# Patient Record
Sex: FEMALE | Race: WHITE | NOT HISPANIC OR LATINO | Employment: FULL TIME | URBAN - METROPOLITAN AREA
[De-identification: names, ages, dates, MRNs, and addresses within clinical notes are randomized per-mention and may not be internally consistent; named-entity substitution may affect disease eponyms.]

---

## 2020-10-28 ENCOUNTER — TELEPHONE (OUTPATIENT)
Dept: DERMATOLOGY | Facility: CLINIC | Age: 30
End: 2020-10-28
Payer: COMMERCIAL

## 2020-10-28 NOTE — TELEPHONE ENCOUNTER
" [facility] Call Center    Phone Message    May a detailed message be left on voicemail: yes     Reason for Call: Other: Pt sent in an online request to be seen for \"Reaction to possible bites on hand and face that won't go away, thought it was insect bites, inflamed, itchy, regular doctors said general dermatitis, have been on topical steroid for 2wks+ and got a little better than worse again\"  Please review and contact pt to discuss symptoms, thanks!     Action Taken: Message routed to:  [facility] ([facility] Dermatology    Travel Screening: Not Applicable                                                                        "

## 2022-08-22 ENCOUNTER — OFFICE VISIT (OUTPATIENT)
Dept: OBGYN | Facility: CLINIC | Age: 32
End: 2022-08-22
Payer: COMMERCIAL

## 2022-08-22 VITALS — WEIGHT: 274.7 LBS | SYSTOLIC BLOOD PRESSURE: 100 MMHG | DIASTOLIC BLOOD PRESSURE: 70 MMHG

## 2022-08-22 DIAGNOSIS — Z30.430 ENCOUNTER FOR INSERTION OF INTRAUTERINE CONTRACEPTIVE DEVICE: ICD-10-CM

## 2022-08-22 DIAGNOSIS — Z30.430 ENCOUNTER FOR INSERTION OF MIRENA IUD: ICD-10-CM

## 2022-08-22 DIAGNOSIS — Z30.46 ENCOUNTER FOR NEXPLANON REMOVAL: ICD-10-CM

## 2022-08-22 PROCEDURE — 58300 INSERT INTRAUTERINE DEVICE: CPT | Performed by: OBSTETRICS & GYNECOLOGY

## 2022-08-22 PROCEDURE — 87591 N.GONORRHOEAE DNA AMP PROB: CPT | Performed by: OBSTETRICS & GYNECOLOGY

## 2022-08-22 PROCEDURE — 87491 CHLMYD TRACH DNA AMP PROBE: CPT | Performed by: OBSTETRICS & GYNECOLOGY

## 2022-08-22 PROCEDURE — 99207 PR DROP WITH A PROCEDURE: CPT | Mod: 51 | Performed by: OBSTETRICS & GYNECOLOGY

## 2022-08-22 PROCEDURE — 11982 REMOVE DRUG IMPLANT DEVICE: CPT | Performed by: OBSTETRICS & GYNECOLOGY

## 2022-08-22 NOTE — PROGRESS NOTES
INDICATIONS:                                                      Michelle is here today for removal of Nexplanon contraceptive implant. It was placed 5 years ago. She would like to have a mirena IUD placed today for contraception. Patient's last menstrual period was 08/12/2022 (exact date). no intercourse since then.     Written consent was obtained for both procedures.     Today's PHQ-2 Score:   PHQ-2 ( 1999 Pf[employer]) 8/22/2022   Q1: Little interest or pleasure in doing things 0   Q2: Feeling down, depressed or hopeless 0   PHQ-2 Score 0       PROCEDURE:                                                      /70   Wt 124.6 kg (274 lb 11.2 oz)   LMP 08/12/2022 (Exact Date)    Patient was placed in dorsal supine position with left arm abducted and externally rotated. Nexplanon was palpated under skin. The area was cleansed with betadine. The distal site was injected with 1 ml of 1% plain lidocaine.  While pushing down on the proximal end, 2 mm incision was made over the distal implant with a scalpel. The implant was grasped with a mosquito forceps and removed intact. The skin was closed with Steristrip. A pressure bandage was placed for the next 12-24 hours.  She tolerated the procedure well. There were no complications.     Intrauterine Device Insertion:    The pelvic exam revealed normal external genitalia. On bimanual exam the uterus was Midposition and normal in size with no tenderness present. A speculum was inserted into the vagina and the cervix was visualized. Gonorrhea/chlamydia swab obtained. The cervix was prepped with Betadine . The anterior lip of the cervix was grasped with a single toothed tenaculum. The uterus sounded to 9 cm. A Mirena IUD was then inserted without difficulty. The string was cut to 2 cm.  The patient experienced a mild amount of cramping.     Return to clinic in w for IUD string check. Counseled on back up method for 7 days.     Swati Champagne MD

## 2022-08-23 LAB
C TRACH DNA SPEC QL NAA+PROBE: NEGATIVE
N GONORRHOEA DNA SPEC QL NAA+PROBE: NEGATIVE

## 2022-08-27 ENCOUNTER — HEALTH MAINTENANCE LETTER (OUTPATIENT)
Age: 32
End: 2022-08-27

## 2022-09-26 ENCOUNTER — OFFICE VISIT (OUTPATIENT)
Dept: OBGYN | Facility: CLINIC | Age: 32
End: 2022-09-26
Payer: COMMERCIAL

## 2022-09-26 VITALS — DIASTOLIC BLOOD PRESSURE: 78 MMHG | SYSTOLIC BLOOD PRESSURE: 116 MMHG | WEIGHT: 268.9 LBS

## 2022-09-26 DIAGNOSIS — Z30.431 IUD CHECK UP: Primary | ICD-10-CM

## 2022-09-26 PROCEDURE — 99213 OFFICE O/P EST LOW 20 MIN: CPT | Performed by: OBSTETRICS & GYNECOLOGY

## 2022-09-26 NOTE — PROGRESS NOTES
Gyn Clinic Visit  2022    CC: IUD follow up    S: Michelle Kaur is a 32 year old  here for follow up visit after placement of mirena IUD on 22. She reports a couple days of scant vaginal bleeding followed by a regular period with increased cramping over baseline, no pain with intercourse. Overall happy with IUD. Did not try NSAIDs for dysmenorrhea.     O:   /78   Wt 122 kg (268 lb 14.4 oz)   LMP 2022 (Exact Date)   Gen: resting comfortably, in NAD  : External genitalia normal well-estrogenized, healthy tissue.  No obvious excoriations, lesions, or rashes. Bartholins, urethra, skeins normal.  Normal pink vaginal mucosa.  SSE: Normal cervix, normal physiologic discharge, IUD strings present at external cervical os.     A: Michelle Kaur is a 32 year old female  here for follow up of IUD. No concerns at this time.     P:   - discussed scheduled NSAIDs for dysmenorrhea, she will try for next 2 cycles and contact me if no improvement.   - IUD due out in 2030. Discussed potential for longer duration of use being approved by that time.   - Return for annual gyn exams and routine pap smears or for any new onset symptoms or concerns.      Swati Champagne MD   Obstetrics and Gynecology

## 2022-09-26 NOTE — NURSING NOTE
Chief Complaint   Patient presents with     IUD     Patient had Mirena IUD inserted 22, the first couple of weeks patient reports she had a lot of cramping. But that has resolved. No concerns today       Initial /78   Wt 122 kg (268 lb 14.4 oz)   LMP 2022 (Exact Date)  There is no height or weight on file to calculate BMI.  BP completed using cuff size: large    Questioned patient about current smoking habits.  Pt. has never smoked.          The following HM Due: NONE      Farrah Tanner CMA

## 2022-11-11 ENCOUNTER — OFFICE VISIT (OUTPATIENT)
Dept: URGENT CARE | Facility: URGENT CARE | Age: 32
End: 2022-11-11
Payer: COMMERCIAL

## 2022-11-11 VITALS
SYSTOLIC BLOOD PRESSURE: 119 MMHG | DIASTOLIC BLOOD PRESSURE: 85 MMHG | OXYGEN SATURATION: 97 % | TEMPERATURE: 98.2 F | HEART RATE: 88 BPM

## 2022-11-11 DIAGNOSIS — J45.21 MILD INTERMITTENT ASTHMA WITH ACUTE EXACERBATION: Primary | ICD-10-CM

## 2022-11-11 DIAGNOSIS — J22 LRTI (LOWER RESPIRATORY TRACT INFECTION): ICD-10-CM

## 2022-11-11 PROCEDURE — 99203 OFFICE O/P NEW LOW 30 MIN: CPT | Performed by: PHYSICIAN ASSISTANT

## 2022-11-11 RX ORDER — ALBUTEROL SULFATE 90 UG/1
1-2 AEROSOL, METERED RESPIRATORY (INHALATION)
Qty: 18 G | Refills: 1 | Status: SHIPPED | OUTPATIENT
Start: 2022-11-11

## 2022-11-11 RX ORDER — PREDNISONE 20 MG/1
40 TABLET ORAL DAILY
Qty: 10 TABLET | Refills: 0 | Status: SHIPPED | OUTPATIENT
Start: 2022-11-11 | End: 2022-11-16

## 2022-11-11 RX ORDER — AZITHROMYCIN 250 MG/1
TABLET, FILM COATED ORAL
Qty: 6 TABLET | Refills: 0 | Status: SHIPPED | OUTPATIENT
Start: 2022-11-11 | End: 2022-11-16

## 2022-11-11 NOTE — LETTER
[facility] [address] [place] [address]  Phone: [phone]    November 11, 2022        Michelle GIANNI [place]  [address] [place] [address]          To whom it may concern:    RE: Michelle Kaur    Patient was seen and treated today at our clinic.  Please excuse absences 11/12 and 11/13/22.    Please contact me for questions or concerns.      Sincerely,        Jeremy Resendiz PA-C

## 2022-11-11 NOTE — PROGRESS NOTES
SUBJECTIVE:   Michelle Kaur is a 32 year old female presenting with a chief complaint of cough - non-productive and wheezing.  Onset of symptoms was 4 day(s) ago.  Course of illness is same.    Severity moderate  Current and Associated symptoms: wheezing  Treatment measures tried include Tylenol/Ibuprofen.  Predisposing factors include HX of asthma.    Past Medical History:   Diagnosis Date     Known health problems: none 08/22/2022     Current Outpatient Medications   Medication Sig Dispense Refill     albuterol (PROAIR HFA/PROVENTIL HFA/VENTOLIN HFA) 108 (90 Base) MCG/ACT inhaler Inhale 1-2 puffs into the lungs every 4 hours (while awake) 18 g 1     azithromycin (ZITHROMAX) 250 MG tablet Take 2 tablets (500 mg) by mouth daily for 1 day, THEN 1 tablet (250 mg) daily for 4 days. 6 tablet 0     levonorgestrel (MIRENA) 20 MCG/DAY IUD 1 each (20 mcg) by Intrauterine route once       predniSONE (DELTASONE) 20 MG tablet Take 2 tablets (40 mg) by mouth daily for 5 days 10 tablet 0     ALBUTEROL IN  (Patient not taking: Reported on 11/11/2022)       Social History     Tobacco Use     Smoking status: Never     Smokeless tobacco: Never   Substance Use Topics     Alcohol use: Not Currently     Comment: Sober since 1/12022       ROS:  Review of systems negative except as stated above.    OBJECTIVE:  /85 (BP Location: Right arm, Patient Position: Sitting, Cuff Size: Adult Large)   Pulse 88   Temp 98.2  F (36.8  C) (Oral)   SpO2 97%   GENERAL APPEARANCE: healthy, alert and no distress  HENT: ear canals and TM's normal.  Nose and mouth without ulcers, erythema or lesions  NECK: supple, nontender, no lymphadenopathy  RESP: lungs clear to auscultation - no rales, rhonchi or wheezes  CV: regular rates and rhythm, normal S1 S2, no murmur noted  NEURO: Normal strength and tone, sensory exam grossly normal,  normal speech and mentation  SKIN: no suspicious lesions or rashes      No results found for any visits on  11/11/22.    ASSESSMENT:  (J45.21) Mild intermittent asthma with acute exacerbation  (primary encounter diagnosis)  (J22) LRTI (lower respiratory tract infection)  Plan: albuterol (PROAIR HFA/PROVENTIL HFA/VENTOLIN         HFA) 108 (90 Base) MCG/ACT inhaler, predniSONE         (DELTASONE) 20 MG tablet, azithromycin         (ZITHROMAX) 250 MG tablet    Home covid testing    Red flags and emergent follow up discussed, and understood by patient  Follow up with PCP if symptoms worsen or fail to improve      There are no Patient Instructions on file for this visit.

## 2023-04-23 ENCOUNTER — HEALTH MAINTENANCE LETTER (OUTPATIENT)
Age: 33
End: 2023-04-23

## 2023-09-23 ENCOUNTER — HEALTH MAINTENANCE LETTER (OUTPATIENT)
Age: 33
End: 2023-09-23

## 2023-12-28 ENCOUNTER — NURSE TRIAGE (OUTPATIENT)
Dept: NURSING | Facility: CLINIC | Age: 33
End: 2023-12-28
Payer: COMMERCIAL

## 2023-12-28 NOTE — TELEPHONE ENCOUNTER
"Nurse Triage SBAR    Is this a 2nd Level Triage? NO    Situation: Pt calling with concerns for SOB.    Background:  Pt was attempting to set up an appointment but was unsuccessful. She started having symptoms of COVID and tested positive on Friday. She felt better over the weekend but has developed a fever today. She also has a hx of bronchitis, asthma, and pneumonia.     Assessment: Pt is having SOB even when at rest. States her O2 was at 90% this morning. She does sound wheezy on the phone. She has been using her inhaler and cough suppressants. Denies CP.    Protocol Recommended Disposition:   Go to ED Now    Recommendation: Pt states she cannot afford to go to an ER. She is going to try the \"doctor on demand\" through her insurance otherwise she may go in to an UC. Advised to wear a mask if she does go in.    Reason for Disposition   MODERATE difficulty breathing (e.g., speaks in phrases, SOB even at rest, pulse 100-120)   Oxygen level (e.g., pulse oximetry) 90 percent or lower    Additional Information   Negative: SEVERE difficulty breathing (e.g., struggling for each breath, speaks in single words)   Negative: Difficult to awaken or acting confused (e.g., disoriented, slurred speech)   Negative: Bluish (or gray) lips or face now   Negative: Shock suspected (e.g., cold/pale/clammy skin, too weak to stand, low BP, rapid pulse)   Negative: Sounds like a life-threatening emergency to the triager   Negative: SEVERE or constant chest pain or pressure  (Exception: Mild central chest pain, present only when coughing.)    Protocols used: Coronavirus (COVID-19) Diagnosed or [id number]-QVMELIA Urbano RN  [facility] Nurse Advisor   12/28/2023  5:09 PM  "

## 2024-11-16 ENCOUNTER — HEALTH MAINTENANCE LETTER (OUTPATIENT)
Age: 34
End: 2024-11-16